# Patient Record
Sex: FEMALE | Race: WHITE | URBAN - METROPOLITAN AREA
[De-identification: names, ages, dates, MRNs, and addresses within clinical notes are randomized per-mention and may not be internally consistent; named-entity substitution may affect disease eponyms.]

---

## 2022-11-17 NOTE — PROGRESS NOTES
Libra Rivas is a 56 y.o. female presents for a problem visit. Chief Complaint   Patient presents with    Well Woman       Problems:  IUD removal      Patient's last menstrual period was 10/16/2022. Birth Control: IUD        1. Have you been to the ER, urgent care clinic, or hospitalized since your last visit? No    2. Have you seen or consulted any other health care providers outside of the [address] [place] [address] since your last visit? No      Chart reviewed for the following:   Facundo RITTER, have reviewed the History, Physical and updated the Allergic reactions for [address]  performed immediately prior to start of procedure:   Karin RITTER CMA, have performed the following reviews on Libra Rivas prior to the start of the procedure:            * Patient was identified by name and date of birth   * Agreement on procedure being performed was verified  * Risks and Benefits explained to the patient  * Procedure site verified and marked as necessary  * Patient was positioned for comfort  * Consent was signed and verified     Time: 220pm    Date of procedure: 11/18/2022    Procedure performed by:  Juan Garay MD    Provider assisted by: Suzanne Ashby CMA     Patient assisted by: self    How tolerated by patient: tolerated the procedure well with no complications    Post Procedural Pain Scale: 0 - No Hurt    Comments: none    Examination chaperoned by Karin Casanova CMA. Libra Rivas is a 56 y.o. female returns for an annual exam     Chief Complaint   Patient presents with    Well Woman       Patient's last menstrual period was 10/16/2022. Her periods are light in flow and usually regular with a 26-32 day interval with 3-7 day duration. She does not have dysmenorrhea. Problems: no significant problems  Birth Control: IUD. Last Pap: pap performed today.   Last Mammogram: has not had a recent mammogram.    Last Bone Density: n/a  Last colonoscopy: never      1. Have you been to the ER, urgent care clinic, or hospitalized since your last visit? No    2. Have you seen or consulted any other health care providers outside of the [address] [place] [address] since your last visit? No    Examination chaperoned by Gunner Aranda CMA.

## 2022-11-18 ENCOUNTER — OFFICE VISIT (OUTPATIENT)
Dept: OBGYN CLINIC | Age: 47
End: 2022-11-18
Payer: COMMERCIAL

## 2022-11-18 VITALS — WEIGHT: 149 LBS | DIASTOLIC BLOOD PRESSURE: 84 MMHG | SYSTOLIC BLOOD PRESSURE: 122 MMHG

## 2022-11-18 DIAGNOSIS — Z01.419 WELL FEMALE EXAM WITH ROUTINE GYNECOLOGICAL EXAM: Primary | ICD-10-CM

## 2022-11-18 PROCEDURE — 58301 REMOVE INTRAUTERINE DEVICE: CPT | Performed by: OBSTETRICS & GYNECOLOGY

## 2022-11-18 PROCEDURE — 99386 PREV VISIT NEW AGE 40-64: CPT | Performed by: OBSTETRICS & GYNECOLOGY

## 2022-11-18 NOTE — PROGRESS NOTES
Annual exam  Christa Jenkins is a No obstetric history on file. ,  56 y.o. female   Patient's last menstrual period was 10/16/2022. She presents for her annual checkup. She is having no significant problems. She requests IUD removal.     Sexual history:    She  reports being sexually active. She reports using the following method of birth control/protection: I.U.D..    Per Nursing Note:  Patient's last menstrual period was 10/16/2022. Her periods are light in flow and usually regular with a 26-32 day interval with 3-7 day duration. She does not have dysmenorrhea. Problems: no significant problems  Birth Control: IUD. Last Pap: pap performed today. Last Mammogram: has not had a recent mammogram.    Last Bone Density: n/a  Last colonoscopy: never    Past Medical History:   Diagnosis Date    Encounter for insertion of mirena IUD     03/2022 per pt     History reviewed. No pertinent surgical history. Allergies: Patient has no allergy information on record. Tobacco History:  reports that she has never smoked. She has never used smokeless tobacco.  Alcohol Abuse:  reports that she does not currently use alcohol. Drug Abuse:  reports no history of drug use.     Family Medical/Cancer History:   Family History   Problem Relation Age of Onset    Hypertension Mother     Diabetes Father     Hypertension Father         Review of Systems - History obtained from the patient  Constitutional: negative for weight loss, fever, night sweats  HEENT: negative for hearing loss, earache, congestion, snoring, sorethroat  CV: negative for chest pain, palpitations, edema  Resp: negative for cough, shortness of breath, wheezing  GI: negative for change in bowel habits, abdominal pain, black or bloody stools  : negative for frequency, dysuria, hematuria, vaginal discharge  MSK: negative for back pain, joint pain, muscle pain  Breast: negative for breast lumps, nipple discharge, galactorrhea  Skin :negative for itching, rash, hives  Neuro: negative for dizziness, headache, confusion, weakness  Psych: negative for anxiety, depression, change in mood  Heme/lymph: negative for bleeding, bruising, pallor    Physical Exam    Visit Vitals  /84   Wt 149 lb (67.6 kg)   LMP 10/16/2022       Constitutional  Appearance: well-nourished, well developed, alert, in no acute distress    HENT  Head and Face: appears normal    Neck  Inspection/Palpation: normal appearance, no masses or tenderness  Lymph Nodes: no lymphadenopathy present  Thyroid: gland size normal, nontender, no nodules or masses present on palpation    Chest  Respiratory Effort: breathing unlabored    Breasts  Inspection of Breasts: breasts symmetrical, no skin changes, no discharge present, nipple appearance normal, no skin retraction present  Palpation of Breasts and Axillae: no masses present on palpation, no breast tenderness  Axillary Lymph Nodes: no lymphadenopathy present    Gastrointestinal  Abdominal Examination: abdomen non-tender to palpation, normal bowel sounds, no masses present  Liver and spleen: no hepatomegaly present, spleen not palpable  Hernias: no hernias identified    Genitourinary  External Genitalia: normal appearance for age, no discharge present, no tenderness present, no inflammatory lesions present, no masses present, no atrophy present  Vagina: normal vaginal vault without central or paravaginal defects, no discharge present, no inflammatory lesions present, no masses present  Bladder: non-tender to palpation  Urethra: appears normal  Cervix: normal   Uterus: normal size, shape and consistency  Adnexa: no adnexal tenderness present, no adnexal masses present  Perineum: perineum within normal limits, no evidence of trauma, no rashes or skin lesions present  Anus: anus within normal limits, no hemorrhoids present  Inguinal Lymph Nodes: no lymphadenopathy present    Skin  General Inspection: no rash, no lesions identified    Neurologic/Psychiatric  Mental Status:  Orientation: grossly oriented to person, place and time  Mood and Affect: mood normal, affect appropriate    Assessment:  Routine gynecologic examination  Her current medical status is satisfactory with no evidence of significant gynecologic issues. Plan:  Counseled re: diet, exercise, healthy lifestyle  Return for yearly wellness visits         IUD REMOVAL  Indications for Removal:  Guadalupe Cain is a No obstetric history on file. ,  56 y.o. female TWO OR MORE RACES whose Patient's last menstrual period was 10/16/2022.  10/16/2022. who presents today for IUD removal. She has not had problems with the IUD. The IUD removal procedure was discussed with the patient and she had no further questions. Procedure: The patient was placed in a dorsal lithotomy position and appropriately draped. On bimanual exam the uterus was anterior and normal in size with no tenderness present. A speculum exam was performed and the cervix was visualized. The cervix was prepped with zephiran solution. The IUD string was visualized. Using ring forceps , the string was grasped and the IUD removed intact. The IUD was shown to the patient.

## 2022-12-06 NOTE — PROGRESS NOTES
Patient advised of Md reviewed labs and does not think she every had abnormal pap smear and was placed on the schedule to have repeat pap smear on 12/28/2022 at 2:30PM    This nurse discussed MD reviewed labs and answered questions. , recommending that she check ACOG  web site for further information. Patient verbalized understanding.

## 2022-12-27 NOTE — PROGRESS NOTES
Rosie Islas is a 56 y.o. female presents for a problem visit. Chief Complaint   Patient presents with    Follow-up     Patient's last menstrual period was 12/04/2022. Birth Control: none. Last Pap: unsatisfactory +HPV on 11/18/2. The patient is reporting having: repeat pap today. She had a unsatisfactory pap with +HPV on 11/18/22. She does not have a history of abnormal pap smears. 1. Have you been to the ER, urgent care clinic, or hospitalized since your last visit? No    2. Have you seen or consulted any other health care providers outside of the [address] [place] [address] since your last visit? No    Examination chaperoned by Gunner Aranda CMA.

## 2022-12-28 ENCOUNTER — OFFICE VISIT (OUTPATIENT)
Dept: OBGYN CLINIC | Age: 47
End: 2022-12-28
Payer: COMMERCIAL

## 2022-12-28 DIAGNOSIS — R87.615 PAP SMEAR OF CERVIX UNSATISFACTORY: Primary | ICD-10-CM

## 2022-12-28 DIAGNOSIS — B97.7 HPV IN FEMALE: ICD-10-CM

## 2022-12-28 PROCEDURE — 99212 OFFICE O/P EST SF 10 MIN: CPT | Performed by: OBSTETRICS & GYNECOLOGY

## 2022-12-28 NOTE — PROGRESS NOTES
GYN Problem Visit Note    Chief Complaint   Follow-up   Patient's last menstrual period was 12/04/2022. Carmen Hill is a 56 y.o. female who complains of   Chief Complaint   Patient presents with    Follow-up     Inadequate pap    Per Nursing Note:  Patient's last menstrual period was 12/04/2022. Birth Control: none. Last Pap: unsatisfactory +HPV on 11/18/2. The patient is reporting having: repeat pap today. She had a unsatisfactory pap with +HPV on 11/18/22. She does not have a history of abnormal pap smears. Past Medical History:   Diagnosis Date    Encounter for insertion of mirena IUD     03/2022 per pt     History reviewed. No pertinent surgical history. Social History     Occupational History    Not on file   Tobacco Use    Smoking status: Never    Smokeless tobacco: Never   Substance and Sexual Activity    Alcohol use: Not Currently    Drug use: Never    Sexual activity: Yes     Birth control/protection: I.U.D.      Family History   Problem Relation Age of Onset    Hypertension Mother     Diabetes Father     Hypertension Father        Not on File  Prior to Admission medications    Not on File        Review of Systems: History obtained from the patient  Constitutional: negative for weight loss, fever, night sweats  Breast: negative for breast lumps, nipple discharge, galactorrhea  GI: negative for change in bowel habits, abdominal pain, black or bloody stools  : negative for frequency, dysuria, hematuria, vaginal discharge  MSK: negative for back pain, joint pain, muscle pain  Skin: negative for itching, rash, hives  Psych: negative for anxiety, depression, change in mood      Objective:  Visit Vitals  LMP 12/04/2022       Physical Exam:   PHYSICAL EXAMINATION    Constitutional  Appearance: well-nourished, well developed, alert, in no acute distress    Gastrointestinal  Abdominal Examination: abdomen non-tender to palpation, normal bowel sounds, no masses present  Liver and spleen: no hepatomegaly present, spleen not palpable  Hernias: no hernias identified    Genitourinary  External Genitalia: normal appearance for age, no discharge present, no tenderness present, no inflammatory lesions present, no masses present, no atrophy present  Vagina: normal vaginal vault without central or paravaginal defects, no discharge present, no inflammatory lesions present, no masses present  Bladder: non-tender to palpation  Urethra: appears normal  Cervix: normal   Uterus: normal size, shape and consistency  Adnexa: no adnexal tenderness present, no adnexal masses present  Perineum: perineum within normal limits, no evidence of trauma, no rashes or skin lesions present  Anus: anus within normal limits, no hemorrhoids present  Inguinal Lymph Nodes: no lymphadenopathy present    Skin  General Inspection: no rash, no lesions identified    Neurologic/Psychiatric  Mental Status:  Orientation: grossly oriented to person, place and time  Mood and Affect: mood normal, affect appropriate      ASSESSMENT/PLAN:    ICD-10-CM ICD-9-CM    1. Pap smear of cervix unsatisfactory  R87.615 795.08 PAP IG, RFX APTIMA HPV ASCUS ([id number])      2. HPV  -discussed HPV diagnosis, pt requests to repeat the HPV on the pap today as she has never had a positive test in the past.     Instructions given to pt. Handouts given to pt.

## 2022-12-29 ENCOUNTER — TELEPHONE (OUTPATIENT)
Dept: OBGYN CLINIC | Age: 47
End: 2022-12-29

## 2022-12-29 NOTE — TELEPHONE ENCOUNTER
Called [facility] and changed pap test to include HPV testing per Dr. Raheel Rose. [facility] representative confirmed pap test was changed successfully.

## 2023-01-01 LAB
CYTOLOGIST CVX/VAG CYTO: ABNORMAL
CYTOLOGY CVX/VAG DOC CYTO: ABNORMAL
CYTOLOGY CVX/VAG DOC THIN PREP: ABNORMAL
CYTOLOGY HISTORY:: ABNORMAL
DX ICD CODE: ABNORMAL
DX ICD CODE: ABNORMAL
HPV GENOTYPE REFLEX: ABNORMAL
HPV I/H RISK 4 DNA CVX QL PROBE+SIG AMP: POSITIVE
Lab: ABNORMAL
OTHER STN SPEC: ABNORMAL
PATHOLOGIST CVX/VAG CYTO: ABNORMAL
STAT OF ADQ CVX/VAG CYTO-IMP: ABNORMAL

## 2023-01-27 ENCOUNTER — TELEPHONE (OUTPATIENT)
Dept: OBGYN CLINIC | Age: 48
End: 2023-01-27

## 2023-01-27 NOTE — TELEPHONE ENCOUNTER
56year old patient last seen in the office on 12/28/2022 and is calling to say that she is supposed to have a colposcopy today and she started her cycle early yesterday and is wondering if she needs to reschedule her appointment    Patient was advised of ngozi to reschedule and was placed on the schedule to be seen for the procedure on 2/13/2023 at 7:50am    Patient verbalized understanding.

## 2023-02-12 NOTE — PROGRESS NOTES
Brennan Cordoba is a 56 y.o. female presents for a problem visit. Chief Complaint   Patient presents with    Colposcopy       Problems: Abnormal Pap     Patient's last menstrual period was 01/26/2023. Birth Control: none. Last Pap: abnormal obtained December 2022.        1. Have you been to the ER, urgent care clinic, or hospitalized since your last visit? No    2. Have you seen or consulted any other health care providers outside of the [address] [place] [address] since your last visit? No      Chart reviewed for the following:   Luisa RITTER, have reviewed the History, Physical and updated the Allergic reactions for [place] [address] performed immediately prior to start of procedure:   Sae RITTER CMA, have performed the following reviews on Brennan Cordoba prior to the start of the procedure:            * Patient was identified by name and date of birth   * Agreement on procedure being performed was verified  * Risks and Benefits explained to the patient  * Procedure site verified and marked as necessary  * Patient was positioned for comfort  * Consent was signed and verified     Time: 7:50am    Date of procedure: 2/13/2023    Procedure performed by:  Daron Arguello MD    Provider assisted by: Kaur Smith CMA     Patient assisted by: self    How tolerated by patient: tolerated the procedure well with no complications    Post Procedural Pain Scale: 2 - Hurts Little Bit    Comments: none    Examination chaperoned by Sae Lange CMA.

## 2023-02-13 ENCOUNTER — OFFICE VISIT (OUTPATIENT)
Dept: OBGYN CLINIC | Age: 48
End: 2023-02-13
Payer: COMMERCIAL

## 2023-02-13 VITALS — WEIGHT: 154 LBS | SYSTOLIC BLOOD PRESSURE: 129 MMHG | DIASTOLIC BLOOD PRESSURE: 84 MMHG

## 2023-02-13 DIAGNOSIS — R87.612 LGSIL ON PAP SMEAR OF CERVIX: Primary | ICD-10-CM

## 2023-02-13 LAB
HCG URINE, QL. (POC): NEGATIVE
VALID INTERNAL CONTROL?: YES

## 2023-02-13 NOTE — PROGRESS NOTES
Procedure Note: Colposcopy    Ade Rodriguez is a No obstetric history on file. ,  56 y.o. female WHITE/NON- Patient's last menstrual period was 01/26/2023. She presents for colposcopy for evaluation of a cervical abnormality with a pap smear abnormality consisting of LSIL, HPV Positive. After being presented with the risks, benefits and alternatives has she signed a consent for the procedure. She states that she understands the need for the procedure and has no further questions. She was informed that she may experience discomfort. Procedure:  She was positioned in the dorsal lithotomy position and a speculum was inserted into the vagina. Dilute acetic acid was applied to the cervix. The colposcope was used to visualize the cervix. Procedure: The transformation zone was completely visualized. Findings: This colposcopy was satisfactory. The procedure was notable for white epithelium on the cervix. Biopsies were taken from the cervix . See accompanying diagram for biopsy sites. Monsels was applied to the cervix. Endocervical currettage: An endocervical curettage was performed. Post Procedure Status: The patient tolerated the procedure well with minimal discomfort. She was observed for 10 minutes and released in good condition.

## 2023-09-19 ENCOUNTER — OFFICE VISIT (OUTPATIENT)
Age: 48
End: 2023-09-19
Payer: COMMERCIAL

## 2023-09-19 VITALS — DIASTOLIC BLOOD PRESSURE: 88 MMHG | WEIGHT: 156 LBS | SYSTOLIC BLOOD PRESSURE: 137 MMHG

## 2023-09-19 DIAGNOSIS — Z31.69 INFERTILITY COUNSELING: ICD-10-CM

## 2023-09-19 DIAGNOSIS — N89.8 VAGINAL IRRITATION: Primary | ICD-10-CM

## 2023-09-19 PROCEDURE — 99213 OFFICE O/P EST LOW 20 MIN: CPT | Performed by: OBSTETRICS & GYNECOLOGY

## 2023-09-19 NOTE — PROGRESS NOTES
GYN Problem Visit Note    Chief Complaint   Vaginitis   Patient's last menstrual period was 08/31/2023. Daya Saravia is a 53 y.o. female who complains of   Chief Complaint   Patient presents with    Vaginitis       Pt reports feeling of vaginal dryness and feels fissures after intercourse or when  labia. She reports that her cycles are regular each month and occur every 28 days. She and her partner are trying for pregnancy. Her last child was 10 years ago. She has no h/o gc/chl, and abdominal surgery. Her partner has no risk factors for azoospermia. Per Nursing Note:  Patient's last menstrual period was 08/31/2023. Birth Control: none. Last Pap: abnormal obtained 12/2022     The patient is reporting having: vaginal irritation. She feels like her vaginal skin is thinning. She denies vaginal odor. Past Medical History:   Diagnosis Date    Encounter for insertion of mirena IUD     03/2022 per pt    LGSIL on Pap smear of cervix 12/2022    HPV Positive, last pap in 2019 was negative and HPV negative     No past surgical history on file.   Social History     Occupational History    Not on file   Tobacco Use    Smoking status: Never    Smokeless tobacco: Never   Substance and Sexual Activity    Alcohol use: Not Currently    Drug use: Never    Sexual activity: Not on file     Family History   Problem Relation Age of Onset    Hypertension Mother     Diabetes Father     Hypertension Father        No Known Allergies  Prior to Admission medications    Not on File        Review of Systems: History obtained from the patient  Constitutional: negative for weight loss, fever, night sweats  Breast: negative for breast lumps, nipple discharge, galactorrhea  GI: negative for change in bowel habits, abdominal pain, black or bloody stools  : negative for frequency, dysuria, hematuria, vaginal discharge  MSK: negative for back pain, joint pain, muscle pain  Skin: negative for itching, rash,

## 2023-09-19 NOTE — PROGRESS NOTES
Sean Chase is a 53 y.o. female presents for a problem visit. Chief Complaint   Patient presents with    Vaginitis     Patient's last menstrual period was 08/31/2023. Birth Control: none. Last Pap: abnormal obtained 12/2022    The patient is reporting having: vaginal irritation. She feels like her vaginal skin is thinning. She denies vaginal odor. 1. Have you been to the ER, urgent care clinic, or hospitalized since your last visit? No    2. Have you seen or consulted any other health care providers outside of the [address] [place] [address] Avenue since your last visit? No    Examination chaperoned by Alexander Leong CMA.

## 2023-09-20 ENCOUNTER — NURSE ONLY (OUTPATIENT)
Age: 48
End: 2023-09-20

## 2023-09-20 DIAGNOSIS — Z31.9 ENCOUNTER FOR INFERTILITY: Primary | ICD-10-CM

## 2023-09-21 ENCOUNTER — TELEPHONE (OUTPATIENT)
Age: 48
End: 2023-09-21

## 2023-09-21 LAB
A VAGINAE DNA VAG QL NAA+PROBE: ABNORMAL SCORE
BVAB2 DNA VAG QL NAA+PROBE: ABNORMAL SCORE
C ALBICANS DNA VAG QL NAA+PROBE: POSITIVE
C GLABRATA DNA VAG QL NAA+PROBE: NEGATIVE
MEGA1 DNA VAG QL NAA+PROBE: ABNORMAL SCORE
SPECIMEN STATUS REPORT: NORMAL
T VAGINALIS DNA VAG QL NAA+PROBE: NEGATIVE

## 2023-09-21 RX ORDER — FLUCONAZOLE 150 MG/1
150 TABLET ORAL DAILY
Qty: 3 TABLET | Refills: 0 | Status: SHIPPED | OUTPATIENT
Start: 2023-09-21 | End: 2023-09-24

## 2023-09-21 NOTE — TELEPHONE ENCOUNTER
PT name and  verified    54 yo last ov 23      PT calling stating she saw results in her [facility] that she had yeast infection and wanted to get a rx sent today before end of office hours. Instructed PT that  is not in the office today and these results have not been reviewed by the MD. Ree Powerse PT to wait until  returns to office tomorrow to review and wait for a response from MD.  PT pharmacy added.     Candida albicans, VANDA Negative Positive Abnormal         Please review results  Thank you

## 2023-09-22 LAB — PROGEST SERPL-MCNC: 13.7 NG/ML

## 2023-09-22 NOTE — TELEPHONE ENCOUNTER
PT call returned, left vm and sent [facility] message that results were reviewed by MD and rx was sent to preferred pharmacy.

## 2025-04-12 NOTE — PROGRESS NOTES
Brandie Dee is a 49 y.o. female returns for an annual exam     Chief Complaint   Patient presents with    Annual Exam       Patient's last menstrual period was 04/01/2025.  Her periods are light in flow and usually regular with a 26-32 day interval with 3-7 day duration.  She does not have dysmenorrhea.  Problems: no problems  Birth Control: none.  Last Pap: abnormal obtained 12/28/22  She does not have a history of SEAN 2, 3 or cervical cancer.   Last Mammogram:has tomorrow.    Last colonoscopy: polyps obtained 1 year(s) ago.        Examination chaperoned by KHADRA FAULKNER RN.

## 2025-04-15 SDOH — ECONOMIC STABILITY: FOOD INSECURITY: WITHIN THE PAST 12 MONTHS, THE FOOD YOU BOUGHT JUST DIDN'T LAST AND YOU DIDN'T HAVE MONEY TO GET MORE.: NEVER TRUE

## 2025-04-15 SDOH — ECONOMIC STABILITY: TRANSPORTATION INSECURITY
IN THE PAST 12 MONTHS, HAS THE LACK OF TRANSPORTATION KEPT YOU FROM MEDICAL APPOINTMENTS OR FROM GETTING MEDICATIONS?: NO

## 2025-04-15 SDOH — ECONOMIC STABILITY: INCOME INSECURITY: IN THE LAST 12 MONTHS, WAS THERE A TIME WHEN YOU WERE NOT ABLE TO PAY THE MORTGAGE OR RENT ON TIME?: NO

## 2025-04-15 SDOH — ECONOMIC STABILITY: FOOD INSECURITY: WITHIN THE PAST 12 MONTHS, YOU WORRIED THAT YOUR FOOD WOULD RUN OUT BEFORE YOU GOT MONEY TO BUY MORE.: NEVER TRUE

## 2025-04-15 SDOH — ECONOMIC STABILITY: TRANSPORTATION INSECURITY
IN THE PAST 12 MONTHS, HAS LACK OF TRANSPORTATION KEPT YOU FROM MEETINGS, WORK, OR FROM GETTING THINGS NEEDED FOR DAILY LIVING?: NO

## 2025-04-15 ASSESSMENT — PATIENT HEALTH QUESTIONNAIRE - PHQ9
SUM OF ALL RESPONSES TO PHQ9 QUESTIONS 1 & 2: 0
SUM OF ALL RESPONSES TO PHQ QUESTIONS 1-9: 0
1. LITTLE INTEREST OR PLEASURE IN DOING THINGS: NOT AT ALL
SUM OF ALL RESPONSES TO PHQ QUESTIONS 1-9: 0
1. LITTLE INTEREST OR PLEASURE IN DOING THINGS: NOT AT ALL
2. FEELING DOWN, DEPRESSED OR HOPELESS: NOT AT ALL
SUM OF ALL RESPONSES TO PHQ QUESTIONS 1-9: 0
SUM OF ALL RESPONSES TO PHQ QUESTIONS 1-9: 0
2. FEELING DOWN, DEPRESSED OR HOPELESS: NOT AT ALL

## 2025-04-16 ENCOUNTER — OFFICE VISIT (OUTPATIENT)
Age: 50
End: 2025-04-16
Payer: COMMERCIAL

## 2025-04-16 VITALS
BODY MASS INDEX: 26.49 KG/M2 | HEART RATE: 75 BPM | WEIGHT: 159 LBS | HEIGHT: 65 IN | RESPIRATION RATE: 18 BRPM | DIASTOLIC BLOOD PRESSURE: 95 MMHG | SYSTOLIC BLOOD PRESSURE: 146 MMHG

## 2025-04-16 DIAGNOSIS — Z12.4 SCREENING FOR CERVICAL CANCER: Primary | ICD-10-CM

## 2025-04-16 DIAGNOSIS — Z86.19 HX OF HUMAN PAPILLOMAVIRUS INFECTION: ICD-10-CM

## 2025-04-16 DIAGNOSIS — R87.612 LGSIL ON PAP SMEAR OF CERVIX: ICD-10-CM

## 2025-04-16 DIAGNOSIS — Z01.419 ENCOUNTER FOR WELL WOMAN EXAM WITH ROUTINE GYNECOLOGICAL EXAM: ICD-10-CM

## 2025-04-16 PROCEDURE — 99459 PELVIC EXAMINATION: CPT | Performed by: OBSTETRICS & GYNECOLOGY

## 2025-04-16 PROCEDURE — 99396 PREV VISIT EST AGE 40-64: CPT | Performed by: OBSTETRICS & GYNECOLOGY

## 2025-04-16 NOTE — PROGRESS NOTES
Annual exam  Brandie Dee is a ,  49 y.o. female   Patient's last menstrual period was 2025.    She presents for her annual checkup.     She has no significant complaints     Sexual history:    She  reports being sexually active and has had partner(s) who are male.    Per Nursing Note:  Patient's last menstrual period was 2025.  Her periods are light in flow and usually regular with a 26-32 day interval with 3-7 day duration.  She does not have dysmenorrhea.  Problems: no problems  Birth Control: none.  Last Pap: abnormal obtained 22  She does not have a history of SEAN 2, 3 or cervical cancer.   Last Mammogram:has tomorrow.    Last colonoscopy: polyps obtained 1 year(s) ago.    Past Medical History:   Diagnosis Date    LGSIL on Pap smear of cervix 2022    HPV Positive, last pap in 2019 was negative and HPV negative     No past surgical history on file.    Current Outpatient Medications   Medication Sig Dispense Refill    Multiple Vitamin (MULTIVITAMIN PO) Take by mouth       No current facility-administered medications for this visit.     Allergies: Patient has no known allergies.     Tobacco History:  reports that she has never smoked. She has never used smokeless tobacco.  Alcohol Abuse:  reports current alcohol use of about 2.0 standard drinks of alcohol per week.  Drug Abuse:  reports no history of drug use.    Family Medical/Cancer History:   Family History   Problem Relation Age of Onset    Hypertension Mother     Diabetes Father     Hypertension Father         Review of Systems - History obtained from the patient  Constitutional: negative for weight loss, fever, night sweats  HEENT: negative for hearing loss, earache, congestion, snoring, sorethroat  CV: negative for chest pain, palpitations, edema  Resp: negative for cough, shortness of breath, wheezing  GI: negative for change in bowel habits, abdominal pain, black or bloody stools  : negative for frequency, dysuria,

## 2025-04-22 ENCOUNTER — RESULTS FOLLOW-UP (OUTPATIENT)
Age: 50
End: 2025-04-22

## 2025-04-22 LAB
CYTOLOGIST CVX/VAG CYTO: NORMAL
CYTOLOGY CVX/VAG DOC CYTO: NORMAL
CYTOLOGY CVX/VAG DOC THIN PREP: NORMAL
DX ICD CODE: NORMAL
HPV GENOTYPE REFLEX: NORMAL
HPV I/H RISK 4 DNA CVX QL PROBE+SIG AMP: NEGATIVE
OTHER STN SPEC: NORMAL
SERVICE CMNT-IMP: NORMAL
STAT OF ADQ CVX/VAG CYTO-IMP: NORMAL